# Patient Record
Sex: MALE | Race: WHITE | NOT HISPANIC OR LATINO | URBAN - METROPOLITAN AREA
[De-identification: names, ages, dates, MRNs, and addresses within clinical notes are randomized per-mention and may not be internally consistent; named-entity substitution may affect disease eponyms.]

---

## 2017-02-26 ENCOUNTER — EMERGENCY (EMERGENCY)
Facility: HOSPITAL | Age: 37
LOS: 1 days | Discharge: HOME | End: 2017-02-26
Attending: EMERGENCY MEDICINE | Admitting: EMERGENCY MEDICINE
Payer: SELF-PAY

## 2017-02-26 VITALS
DIASTOLIC BLOOD PRESSURE: 75 MMHG | RESPIRATION RATE: 20 BRPM | SYSTOLIC BLOOD PRESSURE: 110 MMHG | HEART RATE: 79 BPM | OXYGEN SATURATION: 98 % | WEIGHT: 134.92 LBS | TEMPERATURE: 98 F

## 2017-02-26 PROCEDURE — 99283 EMERGENCY DEPT VISIT LOW MDM: CPT

## 2017-02-26 RX ORDER — PENICILLIN V POTASSIUM 250 MG
1 TABLET ORAL
Qty: 20 | Refills: 0
Start: 2017-02-26 | End: 2017-03-08

## 2017-02-26 RX ORDER — GENTAMICIN SULFATE 3 MG/ML
1 SOLUTION/ DROPS OPHTHALMIC
Qty: 1 | Refills: 0
Start: 2017-02-26 | End: 2017-03-05

## 2017-02-26 NOTE — ED STATDOCS - ATTENDING CONTRIBUTION TO CARE
I, Festus Nelson, performed the initial face to face bedside interview with this patient regarding history of present illness, review of symptoms and relevant past medical, social and family history.  I completed an independent physical examination.  I was the initial provider who evaluated this patient. I have signed out the follow up of any pending tests (i.e. labs, radiological studies) to the ACP.  I have communicated the patient’s plan of care and disposition with the ACP.  The history, relevant review of systems, past medical and surgical history, medical decision making, and physical examination was documented by the scribe in my presence and I attest to the accuracy of the documentation.

## 2017-02-26 NOTE — ED STATDOCS - NS ED MD SCRIBE ATTENDING SCRIBE SECTIONS
HISTORY OF PRESENT ILLNESS/HIV/VITAL SIGNS( Pullset)/INTAKE ASSESSMENT/SCREENINGS/PHYSICAL EXAM/DISPOSITION/REVIEW OF SYSTEMS/PAST MEDICAL/SURGICAL/SOCIAL HISTORY

## 2017-02-26 NOTE — ED STATDOCS - PROGRESS NOTE DETAILS
+ right upper gum tenderness. + multiple dental caries. No gum swelling.   VA: 20/25 bilat. Slit lamp exam- No stain uptake. No FB's noted. No rust rings. + clear cornea. + PERRLA.

## 2017-02-26 NOTE — ED STATDOCS - MEDICAL DECISION MAKING DETAILS
Pt with possible conjunctivitis. Will stain eye to r/o corneal abrasions and possibly treat for conjunctivitis. Pt with possible conjunctivitis. Will stain eye to r/o corneal abrasions and possibly treat for conjunctivitis.  Will treat for possible dental infection as well.

## 2017-02-26 NOTE — ED STATDOCS - EYES, MLM
Visual acuity: 20/25 on right, 20/20 on left. EOMI. Sclera not injected. Mild erythema to right conjunctiva.

## 2017-02-26 NOTE — ED STATDOCS - OBJECTIVE STATEMENT
36 year old male, with hx of asthma, presenting to the ED complaining of right eye pain and pain around his right eye. Pt states that he felt pressure and pain around his right eye yesterday. He states that he fell asleep and awoke to eye pain and pain around his eye again. Pt states that his right eye was watery and had slight discharge and crusting to his right eye. He states that he feels pain with rubbing his right eye. Pt denies having any recent falls or trauma to the area. He also denies having any change in vision or dental pain. No further complaints at this time.

## 2021-06-10 ENCOUNTER — EMERGENCY (EMERGENCY)
Facility: HOSPITAL | Age: 41
LOS: 1 days | Discharge: HOME | End: 2021-06-10
Attending: STUDENT IN AN ORGANIZED HEALTH CARE EDUCATION/TRAINING PROGRAM
Payer: MEDICAID

## 2021-06-10 VITALS
OXYGEN SATURATION: 98 % | DIASTOLIC BLOOD PRESSURE: 85 MMHG | TEMPERATURE: 99 F | SYSTOLIC BLOOD PRESSURE: 140 MMHG | HEART RATE: 75 BPM | RESPIRATION RATE: 18 BRPM

## 2021-06-10 VITALS
TEMPERATURE: 99 F | HEART RATE: 95 BPM | WEIGHT: 134.92 LBS | DIASTOLIC BLOOD PRESSURE: 88 MMHG | RESPIRATION RATE: 20 BRPM | SYSTOLIC BLOOD PRESSURE: 139 MMHG

## 2021-06-10 LAB
ALBUMIN SERPL ELPH-MCNC: 4.8 G/DL — SIGNIFICANT CHANGE UP (ref 3.3–5.2)
ALP SERPL-CCNC: 90 U/L — SIGNIFICANT CHANGE UP (ref 40–120)
ALT FLD-CCNC: 7 U/L — SIGNIFICANT CHANGE UP
ANION GAP SERPL CALC-SCNC: 9 MMOL/L — SIGNIFICANT CHANGE UP (ref 5–17)
AST SERPL-CCNC: 15 U/L — SIGNIFICANT CHANGE UP
BASOPHILS # BLD AUTO: 0.11 K/UL — SIGNIFICANT CHANGE UP (ref 0–0.2)
BASOPHILS NFR BLD AUTO: 0.8 % — SIGNIFICANT CHANGE UP (ref 0–2)
BILIRUB SERPL-MCNC: 0.5 MG/DL — SIGNIFICANT CHANGE UP (ref 0.4–2)
BUN SERPL-MCNC: 10.1 MG/DL — SIGNIFICANT CHANGE UP (ref 8–20)
CALCIUM SERPL-MCNC: 9.6 MG/DL — SIGNIFICANT CHANGE UP (ref 8.6–10.2)
CHLORIDE SERPL-SCNC: 100 MMOL/L — SIGNIFICANT CHANGE UP (ref 98–107)
CK SERPL-CCNC: 89 U/L — SIGNIFICANT CHANGE UP (ref 30–200)
CO2 SERPL-SCNC: 27 MMOL/L — SIGNIFICANT CHANGE UP (ref 22–29)
CREAT SERPL-MCNC: 0.93 MG/DL — SIGNIFICANT CHANGE UP (ref 0.5–1.3)
EOSINOPHIL # BLD AUTO: 0.09 K/UL — SIGNIFICANT CHANGE UP (ref 0–0.5)
EOSINOPHIL NFR BLD AUTO: 0.6 % — SIGNIFICANT CHANGE UP (ref 0–6)
GLUCOSE SERPL-MCNC: 114 MG/DL — HIGH (ref 70–99)
HCT VFR BLD CALC: 44.5 % — SIGNIFICANT CHANGE UP (ref 39–50)
HGB BLD-MCNC: 15.5 G/DL — SIGNIFICANT CHANGE UP (ref 13–17)
IMM GRANULOCYTES NFR BLD AUTO: 0.3 % — SIGNIFICANT CHANGE UP (ref 0–1.5)
LYMPHOCYTES # BLD AUTO: 14.7 % — SIGNIFICANT CHANGE UP (ref 13–44)
LYMPHOCYTES # BLD AUTO: 2.1 K/UL — SIGNIFICANT CHANGE UP (ref 1–3.3)
MCHC RBC-ENTMCNC: 31.8 PG — SIGNIFICANT CHANGE UP (ref 27–34)
MCHC RBC-ENTMCNC: 34.8 GM/DL — SIGNIFICANT CHANGE UP (ref 32–36)
MCV RBC AUTO: 91.4 FL — SIGNIFICANT CHANGE UP (ref 80–100)
MONOCYTES # BLD AUTO: 1.2 K/UL — HIGH (ref 0–0.9)
MONOCYTES NFR BLD AUTO: 8.4 % — SIGNIFICANT CHANGE UP (ref 2–14)
NEUTROPHILS # BLD AUTO: 10.71 K/UL — HIGH (ref 1.8–7.4)
NEUTROPHILS NFR BLD AUTO: 75.2 % — SIGNIFICANT CHANGE UP (ref 43–77)
PLATELET # BLD AUTO: 202 K/UL — SIGNIFICANT CHANGE UP (ref 150–400)
POTASSIUM SERPL-MCNC: 3.6 MMOL/L — SIGNIFICANT CHANGE UP (ref 3.5–5.3)
POTASSIUM SERPL-SCNC: 3.6 MMOL/L — SIGNIFICANT CHANGE UP (ref 3.5–5.3)
PROT SERPL-MCNC: 7.7 G/DL — SIGNIFICANT CHANGE UP (ref 6.6–8.7)
RBC # BLD: 4.87 M/UL — SIGNIFICANT CHANGE UP (ref 4.2–5.8)
RBC # FLD: 12.4 % — SIGNIFICANT CHANGE UP (ref 10.3–14.5)
SODIUM SERPL-SCNC: 136 MMOL/L — SIGNIFICANT CHANGE UP (ref 135–145)
TSH SERPL-MCNC: 2.28 UIU/ML — SIGNIFICANT CHANGE UP (ref 0.27–4.2)
WBC # BLD: 14.25 K/UL — HIGH (ref 3.8–10.5)
WBC # FLD AUTO: 14.25 K/UL — HIGH (ref 3.8–10.5)

## 2021-06-10 PROCEDURE — 99283 EMERGENCY DEPT VISIT LOW MDM: CPT

## 2021-06-10 PROCEDURE — 80053 COMPREHEN METABOLIC PANEL: CPT

## 2021-06-10 PROCEDURE — 82550 ASSAY OF CK (CPK): CPT

## 2021-06-10 PROCEDURE — 99284 EMERGENCY DEPT VISIT MOD MDM: CPT

## 2021-06-10 PROCEDURE — 36415 COLL VENOUS BLD VENIPUNCTURE: CPT

## 2021-06-10 PROCEDURE — 93005 ELECTROCARDIOGRAM TRACING: CPT

## 2021-06-10 PROCEDURE — 84443 ASSAY THYROID STIM HORMONE: CPT

## 2021-06-10 PROCEDURE — 93010 ELECTROCARDIOGRAM REPORT: CPT

## 2021-06-10 PROCEDURE — 85025 COMPLETE CBC W/AUTO DIFF WBC: CPT

## 2021-06-10 RX ADMIN — Medication 1 MILLIGRAM(S): at 02:56

## 2021-06-10 NOTE — ED PROVIDER NOTE - CLINICAL SUMMARY MEDICAL DECISION MAKING FREE TEXT BOX
39yo M presenting with feeling anxious, c/o intermittent diffuse body/joint aches which make him panic at times. Suspect symptoms related to anxiety. Given pt has not seen a doctor in several years labs, ekg ordered. Pt agreeable to trial of ativan 39yo M presenting with feeling anxious, c/o intermittent diffuse body/joint aches which make him panic at times. Suspect symptoms related to anxiety. Given pt has not seen a doctor in several years labs, ekg ordered. Pt agreeable to trial of ativan. Labs reviewed, no emergent intervention warranted based on results. EKG NSR, non-ischemic. Given info for FSL

## 2021-06-10 NOTE — ED PROVIDER NOTE - NSFOLLOWUPINSTRUCTIONS_ED_ALL_ED_FT
- Follow up with a primary doctor within 1-2 weeks, you may also follow-up with [facility] clinic listed below  - Return to the ED for any new or worsening symptoms.

## 2021-06-10 NOTE — ED PROVIDER NOTE - PHYSICAL EXAMINATION
Gen: No acute distress, appears anxious  HEENT: Mucous membranes moist, pink conjunctivae, EOMI  CV: RRR, nl s1/s2.  Resp: CTAB, normal rate and effort  GI: Abdomen soft, NT, ND. No rebound, no guarding  : No CVAT  Neuro: A&O x 3, moving all 4 extremities, ambulatory with steady gait  MSK: No spine or joint tenderness to palpation, FROM ext x 4  Skin: No rashes. intact and perfused.   Psych: Anxious appearing, perseverating on symptoms, No SI/HI

## 2021-06-10 NOTE — ED PROVIDER NOTE - NS ED ROS FT
Gen: denies fever, chills, fatigue, weight loss  Skin: denies rashes, laceration, bruising  HEENT: denies visual changes, ear pain, nasal congestion, throat pain  Respiratory: denies WISE, SOB, cough, wheezing  Cardiovascular: +Heart racing. Denies chest pain, diaphoresis, LE edema  GI: denies abdominal pain, n/v/d  : denies dysuria, frequency, urgency, bowel/bladder incontinence  MSK: +Body aches. Denies joint swelling, back pain, neck pain  Neuro: denies headache, dizziness, weakness, numbness  Psych: +Anxiety. Denies depression, SI/HI, visual/auditory hallucinations

## 2021-06-10 NOTE — ED PROVIDER NOTE - PATIENT PORTAL LINK FT
You can access the FollowMyHealth Patient Portal offered by [facility] by registering at the following website: [email or web address]. By joining [facility]’s FollowMyHealth portal, you will also be able to view your health information using other applications (apps) compatible with our system.

## 2021-06-10 NOTE — ED PROVIDER NOTE - OBJECTIVE STATEMENT
41yo M no pmhx presents to ED c/o feeling anxious. Pt states for the past few days he has not "felt well", feels like he may be having panic attacks. States he sometimes will get pains in his forearms b/l and right foot and will begin to feel anxious when he thinks about the pain or if he looks at his arms and sees his veins "bulging". States he has not had any injuries or trauma. Also mentions he found a tick on his right inner thigh a few weeks ago, found it quickly after being bitten and removed it. States when he thinks about his body aches his heart sometimes races as well. Has never been on anxiety medication, does not see psychiatrist. Has not seen a doctor in a long time as he does not have insurance. Denies SI/HI, hallucinations, cp, sob, headache, dizziness, fever, ETOH use, drug use.

## 2021-06-10 NOTE — ED ADULT TRIAGE NOTE - CHIEF COMPLAINT QUOTE
PT Coming to ED C/O of a panic attack. Increased anxiety the past 2 days. PT states he has body aches and nausea. Recently had a tick bite to his right inner thigh.  Froylan SOB or chest pain.

## 2021-06-10 NOTE — ED PROVIDER NOTE - ATTENDING CONTRIBUTION TO CARE
41yo male with no pmh presents for anxiety. Pt states for the past few days he feels he's been having panic attacks, States he feels tingling in his extremities and sees his veins bulging. Pt had a tick a few weeks ago but pulled out right away was not embedded. No rash. Pt denies SI/HI/VH/AH, fevers/chills, ha, loc, focal neuro deficits, cp/sob/palp, cough, abd pain/n/v/d, urinary symptoms, recent travel and sick contacts.  Const: Awake, alert and oriented. In no acute distress. anxious appearing.  HEENT: NC/AT. Moist mucous membranes.  Eyes: No scleral icterus. EOMI.  Neck:. Soft and supple. Full ROM without pain.  Cardiac: Regular rate and regular rhythm. +S1/S2. Peripheral pulses 2+ and symmetric. No LE edema.  Resp: Speaking in full sentences. No evidence of respiratory distress. No wheezes, rales or rhonchi.  Abd: Soft, non-tender, non-distended. Normal bowel sounds in all 4 quadrants. No guarding or rebound.  Back: Spine midline and non-tender. No CVAT.  Skin: No rashes, abrasions or lacerations.  Lymph: No cervical lymphadenopathy.  Neuro: A&Ox3, moving all extremities symmetrically, follows commands, motor erum upper and lower ext 5/5, sensory symm and intact CN 2-12 grossly intact, no ataxia, no nystagmus, no dysmetria, no ddk, symm erum, no pronator drift  labs wnl, ekg wnl, improved with anxiolytic, stable for dc with follow up

## 2021-08-06 PROBLEM — Z00.00 ENCOUNTER FOR PREVENTIVE HEALTH EXAMINATION: Status: ACTIVE | Noted: 2021-08-06

## 2021-09-14 ENCOUNTER — APPOINTMENT (OUTPATIENT)
Dept: OTOLARYNGOLOGY | Facility: CLINIC | Age: 41
End: 2021-09-14
Payer: MEDICAID

## 2021-09-14 VITALS
WEIGHT: 116 LBS | SYSTOLIC BLOOD PRESSURE: 133 MMHG | HEIGHT: 68 IN | DIASTOLIC BLOOD PRESSURE: 87 MMHG | HEART RATE: 75 BPM | BODY MASS INDEX: 17.58 KG/M2

## 2021-09-14 DIAGNOSIS — Z87.09 PERSONAL HISTORY OF OTHER DISEASES OF THE RESPIRATORY SYSTEM: ICD-10-CM

## 2021-09-14 DIAGNOSIS — Z78.9 OTHER SPECIFIED HEALTH STATUS: ICD-10-CM

## 2021-09-14 PROCEDURE — 99203 OFFICE O/P NEW LOW 30 MIN: CPT

## 2021-09-14 NOTE — CONSULT LETTER
[Dear  ___] : Dear  [unfilled], [Consult Letter:] : I had the pleasure of evaluating your patient, [unfilled]. [Please see my note below.] : Please see my note below. [Consult Closing:] : Thank you very much for allowing me to participate in the care of this patient.  If you have any questions, please do not hesitate to contact me. [Sincerely,] : Sincerely, [FreeTextEntry2] : Dr. Rod Hamilton MD ([place], [place]) [FreeTextEntry3] : Will Mireles MD, FACS\par \par [facility] [place]\par Associate Chair\par Department of Otolaryngology\par Professor\par Otolaryngology & Molecular Medicine\par [facility] [employer]\par

## 2021-09-14 NOTE — CONSULT LETTER
[Dear  ___] : Dear  [unfilled], [Consult Letter:] : I had the pleasure of evaluating your patient, [unfilled]. [Please see my note below.] : Please see my note below. [Consult Closing:] : Thank you very much for allowing me to participate in the care of this patient.  If you have any questions, please do not hesitate to contact me. [Sincerely,] : Sincerely, [FreeTextEntry2] : Dr. Rod Hamilton MD ([place], [place]) [FreeTextEntry3] : Will Mireles MD, FACS\par \par [facility] [place]\par Associate Chair\par Department of Otolaryngology\par Professor\par Otolaryngology & Molecular Medicine\par [facility] [employer]\par

## 2021-09-14 NOTE — HISTORY OF PRESENT ILLNESS
[de-identified] : 40 year old male referred by Dr. Rod Hamilton, PCP, for right parotid mass.  States noticed mass years ago, has gotten larger since first noticed.  States occasionally painful.  Neck ultrasound 7/28/21 impression: right neck mass corresponds to right parotid vascular solid lesion.  FNA advised.  Denies fevers, chills, night sweats, or weight loss.\par Received Nico and Nico Covid 19 vaccine: 4/3/21. present more than 5 years.  not overtly painful.  has been enlarging.  reports good health otherwise. \par \par pt smokes half ppd.

## 2021-09-14 NOTE — PHYSICAL EXAM
[Nodule] : nodule [FreeTextEntry1] : 4 cm mobile R lower parotid body mass.  no palp nodes [Midline] : trachea located in midline position [Normal] : no rashes

## 2021-09-14 NOTE — HISTORY OF PRESENT ILLNESS
[de-identified] : 40 year old male referred by Dr. Rod Hamilton, PCP, for right parotid mass.  States noticed mass years ago, has gotten larger since first noticed.  States occasionally painful.  Neck ultrasound 7/28/21 impression: right neck mass corresponds to right parotid vascular solid lesion.  FNA advised.  Denies fevers, chills, night sweats, or weight loss.\par Received Nico and Nico Covid 19 vaccine: 4/3/21. present more than 5 years.  not overtly painful.  has been enlarging.  reports good health otherwise. \par \par pt smokes half ppd.

## 2021-09-14 NOTE — REASON FOR VISIT
[Initial Evaluation] : an initial evaluation for [Initial Consultation] : an initial consultation for [FreeTextEntry2] : referred by Dr. Rod Hamilton, PCP, for right parotid mass

## 2021-09-16 ENCOUNTER — RESULT REVIEW (OUTPATIENT)
Age: 41
End: 2021-09-16

## 2021-09-27 ENCOUNTER — APPOINTMENT (OUTPATIENT)
Dept: MRI IMAGING | Facility: CLINIC | Age: 41
End: 2021-09-27
Payer: MEDICAID

## 2021-09-27 ENCOUNTER — OUTPATIENT (OUTPATIENT)
Dept: OUTPATIENT SERVICES | Facility: HOSPITAL | Age: 41
LOS: 1 days | End: 2021-09-27

## 2021-09-27 DIAGNOSIS — K11.8 OTHER DISEASES OF SALIVARY GLANDS: ICD-10-CM

## 2021-09-27 DIAGNOSIS — Z00.8 ENCOUNTER FOR OTHER GENERAL EXAMINATION: ICD-10-CM

## 2021-09-27 PROCEDURE — 70543 MRI ORBT/FAC/NCK W/O &W/DYE: CPT | Mod: 26

## 2021-10-05 ENCOUNTER — RESULT REVIEW (OUTPATIENT)
Age: 41
End: 2021-10-05

## 2021-10-05 ENCOUNTER — APPOINTMENT (OUTPATIENT)
Dept: ULTRASOUND IMAGING | Facility: CLINIC | Age: 41
End: 2021-10-05
Payer: MEDICAID

## 2021-10-05 ENCOUNTER — OUTPATIENT (OUTPATIENT)
Dept: OUTPATIENT SERVICES | Facility: HOSPITAL | Age: 41
LOS: 1 days | End: 2021-10-05
Payer: MEDICAID

## 2021-10-05 DIAGNOSIS — Z00.8 ENCOUNTER FOR OTHER GENERAL EXAMINATION: ICD-10-CM

## 2021-10-05 PROCEDURE — 88305 TISSUE EXAM BY PATHOLOGIST: CPT | Mod: 26

## 2021-10-05 PROCEDURE — 88305 TISSUE EXAM BY PATHOLOGIST: CPT

## 2021-10-05 PROCEDURE — 88173 CYTOPATH EVAL FNA REPORT: CPT | Mod: 26

## 2021-10-05 PROCEDURE — 88173 CYTOPATH EVAL FNA REPORT: CPT

## 2021-10-05 PROCEDURE — 10005 FNA BX W/US GDN 1ST LES: CPT

## 2021-10-14 LAB — NON-GYNECOLOGICAL CYTOLOGY STUDY: SIGNIFICANT CHANGE UP

## 2021-11-08 ENCOUNTER — OUTPATIENT (OUTPATIENT)
Dept: OUTPATIENT SERVICES | Facility: HOSPITAL | Age: 41
LOS: 1 days | End: 2021-11-08
Payer: MEDICAID

## 2021-11-08 VITALS
TEMPERATURE: 98 F | RESPIRATION RATE: 18 BRPM | WEIGHT: 109.35 LBS | SYSTOLIC BLOOD PRESSURE: 126 MMHG | HEIGHT: 68 IN | DIASTOLIC BLOOD PRESSURE: 86 MMHG | HEART RATE: 92 BPM | OXYGEN SATURATION: 100 %

## 2021-11-08 DIAGNOSIS — F17.200 NICOTINE DEPENDENCE, UNSPECIFIED, UNCOMPLICATED: ICD-10-CM

## 2021-11-08 DIAGNOSIS — Z01.818 ENCOUNTER FOR OTHER PREPROCEDURAL EXAMINATION: ICD-10-CM

## 2021-11-08 DIAGNOSIS — K11.8 OTHER DISEASES OF SALIVARY GLANDS: ICD-10-CM

## 2021-11-08 DIAGNOSIS — F41.9 ANXIETY DISORDER, UNSPECIFIED: ICD-10-CM

## 2021-11-08 LAB
ANION GAP SERPL CALC-SCNC: 10 MMOL/L — SIGNIFICANT CHANGE UP (ref 5–17)
BUN SERPL-MCNC: 8 MG/DL — SIGNIFICANT CHANGE UP (ref 7–23)
CALCIUM SERPL-MCNC: 9.3 MG/DL — SIGNIFICANT CHANGE UP (ref 8.4–10.5)
CHLORIDE SERPL-SCNC: 104 MMOL/L — SIGNIFICANT CHANGE UP (ref 96–108)
CO2 SERPL-SCNC: 27 MMOL/L — SIGNIFICANT CHANGE UP (ref 22–31)
CREAT SERPL-MCNC: 1.05 MG/DL — SIGNIFICANT CHANGE UP (ref 0.5–1.3)
GLUCOSE SERPL-MCNC: 98 MG/DL — SIGNIFICANT CHANGE UP (ref 70–99)
HCT VFR BLD CALC: 46.9 % — SIGNIFICANT CHANGE UP (ref 39–50)
HGB BLD-MCNC: 16.1 G/DL — SIGNIFICANT CHANGE UP (ref 13–17)
MCHC RBC-ENTMCNC: 32.1 PG — SIGNIFICANT CHANGE UP (ref 27–34)
MCHC RBC-ENTMCNC: 34.3 GM/DL — SIGNIFICANT CHANGE UP (ref 32–36)
MCV RBC AUTO: 93.4 FL — SIGNIFICANT CHANGE UP (ref 80–100)
NRBC # BLD: 0 /100 WBCS — SIGNIFICANT CHANGE UP (ref 0–0)
PLATELET # BLD AUTO: 225 K/UL — SIGNIFICANT CHANGE UP (ref 150–400)
POTASSIUM SERPL-MCNC: 4.1 MMOL/L — SIGNIFICANT CHANGE UP (ref 3.5–5.3)
POTASSIUM SERPL-SCNC: 4.1 MMOL/L — SIGNIFICANT CHANGE UP (ref 3.5–5.3)
RBC # BLD: 5.02 M/UL — SIGNIFICANT CHANGE UP (ref 4.2–5.8)
RBC # FLD: 12.8 % — SIGNIFICANT CHANGE UP (ref 10.3–14.5)
SODIUM SERPL-SCNC: 141 MMOL/L — SIGNIFICANT CHANGE UP (ref 135–145)
WBC # BLD: 8.68 K/UL — SIGNIFICANT CHANGE UP (ref 3.8–10.5)
WBC # FLD AUTO: 8.68 K/UL — SIGNIFICANT CHANGE UP (ref 3.8–10.5)

## 2021-11-08 PROCEDURE — 85027 COMPLETE CBC AUTOMATED: CPT

## 2021-11-08 PROCEDURE — 93010 ELECTROCARDIOGRAM REPORT: CPT | Mod: NC

## 2021-11-08 PROCEDURE — 80048 BASIC METABOLIC PNL TOTAL CA: CPT

## 2021-11-08 PROCEDURE — 36415 COLL VENOUS BLD VENIPUNCTURE: CPT

## 2021-11-08 PROCEDURE — 93005 ELECTROCARDIOGRAM TRACING: CPT

## 2021-11-08 PROCEDURE — G0463: CPT

## 2021-11-08 NOTE — H&P PST ADULT - CARDIOVASCULAR COMMENTS
had cardiac work up in 7/2021 due to panic attract causing palpitations- was neg for acute findings per patient

## 2021-11-08 NOTE — H&P PST ADULT - PROBLEM SELECTOR PLAN 1
Scheduled for right parotidectomy w frozen section with Dr Mireles on 11/22/2021.  Pre op instructions given and patient verbalized understanding.  CBC, BMP, EKG and medical clearance pending.  Chlorhexidene wash given with instructions.  NPO after midnight night before procedure.  Instructed to stop all ASA, NSAIDs, vitamins and supplements 1 week prior to surgery.

## 2021-11-08 NOTE — H&P PST ADULT - NSICDXPASTMEDICALHX_GEN_ALL_CORE_FT
PAST MEDICAL HISTORY:  Anxiety     Asthma     Panic disorder      PAST MEDICAL HISTORY:  Anxiety     Asthma     Current smoker     Panic disorder

## 2021-11-08 NOTE — H&P PST ADULT - NEGATIVE ENMT SYMPTOMS
no hearing difficulty/no ear pain/no nasal congestion/no nasal discharge/no nasal obstruction/no post-nasal discharge/no nose bleeds/no recurrent cold sores/no throat pain

## 2021-11-08 NOTE — H&P PST ADULT - HISTORY OF PRESENT ILLNESS
42 y/o female with PMH of presents for PST.  Scheduled for right parotidectomy w frozen sections on 11/22/2021.  COVID- 19 testing  42 y/o male, current every day cigarette and marijuana smoker, with a PMH of anxiety and asthma ( denies recent exacerbation of hx of intubation) of presents for PST.  Scheduled for right parotidectomy w frozen sections on 11/22/2021.  COVID- 19 testing scheduled for 11/19/2021 at [facility]

## 2021-11-08 NOTE — H&P PST ADULT - NSANTHOSAYNRD_GEN_A_CORE
neck 13.25 inches/No. HIMANSHU screening performed.  STOP BANG Legend: 0-2 = LOW Risk; 3-4 = INTERMEDIATE Risk; 5-8 = HIGH Risk

## 2021-11-10 DIAGNOSIS — Z01.818 ENCOUNTER FOR OTHER PREPROCEDURAL EXAMINATION: ICD-10-CM

## 2021-11-19 ENCOUNTER — APPOINTMENT (OUTPATIENT)
Dept: DISASTER EMERGENCY | Facility: CLINIC | Age: 41
End: 2021-11-19

## 2021-11-20 LAB — SARS-COV-2 N GENE NPH QL NAA+PROBE: NOT DETECTED

## 2021-11-21 ENCOUNTER — TRANSCRIPTION ENCOUNTER (OUTPATIENT)
Age: 41
End: 2021-11-21

## 2021-11-22 ENCOUNTER — RESULT REVIEW (OUTPATIENT)
Age: 41
End: 2021-11-22

## 2021-11-22 ENCOUNTER — APPOINTMENT (OUTPATIENT)
Dept: OTOLARYNGOLOGY | Facility: HOSPITAL | Age: 41
End: 2021-11-22

## 2021-11-22 ENCOUNTER — OUTPATIENT (OUTPATIENT)
Dept: OUTPATIENT SERVICES | Facility: HOSPITAL | Age: 41
LOS: 1 days | End: 2021-11-22
Payer: MEDICAID

## 2021-11-22 VITALS
HEART RATE: 86 BPM | HEIGHT: 68 IN | OXYGEN SATURATION: 100 % | TEMPERATURE: 98 F | RESPIRATION RATE: 16 BRPM | WEIGHT: 109.35 LBS | SYSTOLIC BLOOD PRESSURE: 136 MMHG | DIASTOLIC BLOOD PRESSURE: 79 MMHG

## 2021-11-22 VITALS
DIASTOLIC BLOOD PRESSURE: 67 MMHG | OXYGEN SATURATION: 98 % | HEART RATE: 80 BPM | RESPIRATION RATE: 18 BRPM | SYSTOLIC BLOOD PRESSURE: 132 MMHG | TEMPERATURE: 98 F

## 2021-11-22 DIAGNOSIS — K11.8 OTHER DISEASES OF SALIVARY GLANDS: ICD-10-CM

## 2021-11-22 PROCEDURE — 42420 EXCISE PAROTID GLAND/LESION: CPT | Mod: RT

## 2021-11-22 PROCEDURE — 42415 EXCISE PAROTID GLAND/LESION: CPT | Mod: RT

## 2021-11-22 PROCEDURE — 88307 TISSUE EXAM BY PATHOLOGIST: CPT

## 2021-11-22 PROCEDURE — C9399: CPT

## 2021-11-22 PROCEDURE — C1889: CPT

## 2021-11-22 PROCEDURE — 88307 TISSUE EXAM BY PATHOLOGIST: CPT | Mod: 26

## 2021-11-22 RX ORDER — HYDROMORPHONE HYDROCHLORIDE 2 MG/ML
0.5 INJECTION INTRAMUSCULAR; INTRAVENOUS; SUBCUTANEOUS
Refills: 0 | Status: DISCONTINUED | OUTPATIENT
Start: 2021-11-22 | End: 2021-11-22

## 2021-11-22 RX ORDER — IBUPROFEN 200 MG
1 TABLET ORAL
Qty: 0 | Refills: 0 | DISCHARGE

## 2021-11-22 RX ORDER — ONDANSETRON 8 MG/1
4 TABLET, FILM COATED ORAL ONCE
Refills: 0 | Status: DISCONTINUED | OUTPATIENT
Start: 2021-11-22 | End: 2021-11-22

## 2021-11-22 RX ORDER — ACETAMINOPHEN 500 MG
2 TABLET ORAL
Qty: 0 | Refills: 0 | DISCHARGE

## 2021-11-22 RX ORDER — HYDROMORPHONE HYDROCHLORIDE 2 MG/ML
1 INJECTION INTRAMUSCULAR; INTRAVENOUS; SUBCUTANEOUS
Refills: 0 | Status: DISCONTINUED | OUTPATIENT
Start: 2021-11-22 | End: 2021-11-22

## 2021-11-22 RX ORDER — OXYCODONE HYDROCHLORIDE 5 MG/1
5 TABLET ORAL ONCE
Refills: 0 | Status: DISCONTINUED | OUTPATIENT
Start: 2021-11-22 | End: 2021-11-22

## 2021-11-22 RX ORDER — SODIUM CHLORIDE 9 MG/ML
1000 INJECTION, SOLUTION INTRAVENOUS
Refills: 0 | Status: DISCONTINUED | OUTPATIENT
Start: 2021-11-22 | End: 2021-11-22

## 2021-11-22 RX ORDER — CEPHALEXIN 500 MG
1 CAPSULE ORAL
Qty: 15 | Refills: 0
Start: 2021-11-22 | End: 2021-11-26

## 2021-11-22 RX ORDER — OXYCODONE HYDROCHLORIDE 5 MG/1
1 TABLET ORAL
Qty: 12 | Refills: 0
Start: 2021-11-22 | End: 2021-11-24

## 2021-11-22 RX ORDER — DULOXETINE HYDROCHLORIDE 30 MG/1
1 CAPSULE, DELAYED RELEASE ORAL
Qty: 0 | Refills: 0 | DISCHARGE

## 2021-11-22 RX ORDER — ONDANSETRON 8 MG/1
4 TABLET, FILM COATED ORAL ONCE
Refills: 0 | Status: DISCONTINUED | OUTPATIENT
Start: 2021-11-22 | End: 2021-12-06

## 2021-11-22 RX ADMIN — SODIUM CHLORIDE 50 MILLILITER(S): 9 INJECTION, SOLUTION INTRAVENOUS at 09:15

## 2021-11-22 RX ADMIN — HYDROMORPHONE HYDROCHLORIDE 0.5 MILLIGRAM(S): 2 INJECTION INTRAMUSCULAR; INTRAVENOUS; SUBCUTANEOUS at 14:57

## 2021-11-22 RX ADMIN — OXYCODONE HYDROCHLORIDE 5 MILLIGRAM(S): 5 TABLET ORAL at 16:31

## 2021-11-22 RX ADMIN — HYDROMORPHONE HYDROCHLORIDE 0.5 MILLIGRAM(S): 2 INJECTION INTRAMUSCULAR; INTRAVENOUS; SUBCUTANEOUS at 14:43

## 2021-11-22 RX ADMIN — OXYCODONE HYDROCHLORIDE 5 MILLIGRAM(S): 5 TABLET ORAL at 17:01

## 2021-11-22 NOTE — ASU DISCHARGE PLAN (ADULT/PEDIATRIC) - CARE PROVIDER_API CALL
Will Mireles)  [facility]; Otolaryngology  [address] [place] [id number]  Phone: [phone]  Fax: [phone]  Follow Up Time:

## 2021-11-22 NOTE — PRE-ANESTHESIA EVALUATION ADULT - NSANTHADDINFOFT_GEN_ALL_CORE
Discussed GA with ETT in detail with patient and all question sought and answered. Pt. agrees to all plans and wishes to proceed with surgical care.    Medical evaluation/optimization and clearance noted and appreciated.

## 2021-11-22 NOTE — ASU DISCHARGE PLAN (ADULT/PEDIATRIC) - ASU DC SPECIAL INSTRUCTIONSFT
DO NOT GET INCISION WET    EMPTY DRAIN DAILY AND RECORD OUTPUT    TAKE ANTIBIOTIC DAILY UNTIL FINISHED    TAKE OXYCODONE FOR SEVERE PAIN, OTHERWISE TAKE TYLENOL    DO NOT DRIVE WHILE TAKING OXYCODONE    NO HEAVY LIFTING, BENDING OR STRAINING    FOLLOW UP WITH DR. BORRERO ON 11/24/21, PLEASE CALL THE OFFICE FOR AN APPOINTMENT

## 2021-11-23 PROBLEM — F41.0 PANIC DISORDER [EPISODIC PAROXYSMAL ANXIETY]: Chronic | Status: ACTIVE | Noted: 2021-11-08

## 2021-11-23 PROBLEM — F17.200 NICOTINE DEPENDENCE, UNSPECIFIED, UNCOMPLICATED: Chronic | Status: ACTIVE | Noted: 2021-11-08

## 2021-11-23 PROBLEM — J45.909 UNSPECIFIED ASTHMA, UNCOMPLICATED: Chronic | Status: ACTIVE | Noted: 2021-11-08

## 2021-11-23 PROBLEM — F41.9 ANXIETY DISORDER, UNSPECIFIED: Chronic | Status: ACTIVE | Noted: 2021-11-08

## 2021-11-24 ENCOUNTER — APPOINTMENT (OUTPATIENT)
Dept: OTOLARYNGOLOGY | Facility: CLINIC | Age: 41
End: 2021-11-24
Payer: MEDICAID

## 2021-11-24 VITALS — TEMPERATURE: 97.7 F

## 2021-11-24 PROCEDURE — 99024 POSTOP FOLLOW-UP VISIT: CPT

## 2021-11-24 NOTE — HISTORY OF PRESENT ILLNESS
[None] : No associated symptoms are reported. [de-identified] : Mr. Taylor is s/p right parotidectomy on 11/22/2021 for parotid gland mass. Reports feeling well and has not noticed much fluid coming out of the drain. Denies fever, pain, dysphagia, dysphonia and or dyspnea. He is taking antibiotics as prescribed.

## 2021-11-24 NOTE — REASON FOR VISIT
[Post-Operative Visit] : a post-operative visit [FreeTextEntry2] : s/p right parotidectomy on 11/22/2021

## 2021-11-30 LAB — SURGICAL PATHOLOGY STUDY: SIGNIFICANT CHANGE UP

## 2021-12-01 ENCOUNTER — APPOINTMENT (OUTPATIENT)
Dept: OTOLARYNGOLOGY | Facility: CLINIC | Age: 41
End: 2021-12-01
Payer: MEDICAID

## 2021-12-01 PROCEDURE — 99024 POSTOP FOLLOW-UP VISIT: CPT

## 2021-12-06 NOTE — CONSULT LETTER
[Dear  ___] : Dear  [unfilled], [Please see my note below.] : Please see my note below. [Sincerely,] : Sincerely, [FreeTextEntry2] : Dr. Rod Hamilton MD ([place], [place])  [FreeTextEntry3] : Stacy Hairston ([place]) PAC\par Dr. Will Mireles\par Head and Neck Surgery\par [facility]\par [address]\[place] [address]\par Tel: [phone]\par \par

## 2021-12-06 NOTE — HISTORY OF PRESENT ILLNESS
[de-identified] : Pt is here today post right parotidectomy on 11/22/2021 for parotid mass.  pt is doing well, and no c.o. pt has no neck/face redness, no neck/face mass, no difficulty swallowing, no neck pain, no painful swallowing and no chills. final path cw pleomorphic adenoma. \par \par

## 2022-01-11 ENCOUNTER — APPOINTMENT (OUTPATIENT)
Dept: OTOLARYNGOLOGY | Facility: CLINIC | Age: 42
End: 2022-01-11
Payer: MEDICAID

## 2022-01-11 VITALS
SYSTOLIC BLOOD PRESSURE: 126 MMHG | HEART RATE: 80 BPM | HEIGHT: 68 IN | WEIGHT: 119 LBS | BODY MASS INDEX: 18.04 KG/M2 | DIASTOLIC BLOOD PRESSURE: 85 MMHG | OXYGEN SATURATION: 99 %

## 2022-01-11 DIAGNOSIS — K11.8 OTHER DISEASES OF SALIVARY GLANDS: ICD-10-CM

## 2022-01-11 PROCEDURE — 99024 POSTOP FOLLOW-UP VISIT: CPT

## 2022-01-11 NOTE — HISTORY OF PRESENT ILLNESS
[None] : No associated symptoms are reported. [de-identified] : Mr. Taylor is s/p  right parotidectomy on 11/22/2021 for parotid mass.Final path cw pleomorphic adenoma. \par Denies pain, dysphagia, dysphonia and or dyspnea .  co some neck stiffness\par

## 2022-01-11 NOTE — CONSULT LETTER
[Dear  ___] : Dear  [unfilled], [Courtesy Letter:] : I had the pleasure of seeing your patient, [unfilled], in my office today. [Please see my note below.] : Please see my note below. [Sincerely,] : Sincerely, [FreeTextEntry2] : Dr. Rod Hamilton MD ([place], [place]) \par \par  [FreeTextEntry3] : Will Mireles MD, FACS\par \par    [facility] [place]\par Associate Chair\par    Department of Otolaryngology\par \par Professor\par Otolaryngology & Molecular Medicine\par [facility] [employer]\par

## 2022-12-05 NOTE — ED PROVIDER NOTE - NS ED MD DISPO DISCHARGE
Consults    Reason for the Consult: Iron deficiency anemia    Staging: Cancer Staging  No matching staging information was found for the patient.     History Of Present Illness  Monie is a 48 year old female presenting with history of iron deficiency anemia.  Presently she states that she has fatigue ongoing heavy periods which has been worse for the past 6 months.  She received Lupron recently but this has not taken effect yet she does have some mild hot flashes.  She has a sensation of feeling dizzy when she gets up sometimes she has occasional headaches and occasional dyspnea on exertion.  She denies any chest pain or cough.  She denies any change in her vision or swallowing difficulties or hearing loss or nausea or vomiting or fevers or chills or night sweats or weight loss.  She denies any urination problems or diarrhea or constipation.  She has had heavy periods for as long as she can remember and has been worse for past 6 months.  She denies any blood in her rectum or any blood in her urine or hematemesis.  She denies any neuropathy or focal neurological deficits other than occasional dizziness.  She is able to do her own self-care and all her ADLs.    Performance status: ECOG 0    Hematologic history prior presentation:  Remembers being told that she has been anemic since childhood  She has had microcytic anemia since at least 2018  She remembers trying oral iron with intolerance with nausea and vomiting and states that she received IV iron 4 weekly injections and approximately 2019 and the [facility] complicated by development of painful lymphadenopathy but no hives or shortness of breath.  Lymphadenopathy quickly resolved  10/24/2019 % sat 2 ferritin 3  11/3/2022 WBC 11.8 hemoglobin 8.2 MCV 63.5 platelets 399 normal differential      Review of Systems    12 point ROS obtained and negative other that mentioned the HPI.    Past Medical History  Past Medical History:   Diagnosis Date   • Anemia     dx in childhood    • Breast cyst     R- follows w/ imaging   • Closed left arm fracture    • Hemorrhoids    • Iron deficiency     unable to tolerate PO or IV Iron   • Kidney stone 2018   • Lipoma of back     x2 R back, x1 abdomen; Present >10yrs   • Mastodynia     [place]   • Menorrhagia     w/ Uterine fibroids - pending Hysterectomy surgery   • Mitral valve prolapse    • Skull fracture (CMS/HCC)     1 yo   • Urinary incontinence         Surgical History  Past Surgical History:   Procedure Laterality Date   • Appendectomy     • Tubal ligation          Social History:  Social History     Tobacco Use   • Smoking status: Current Every Day Smoker     Packs/day: 0.25     Years: 32.00     Pack years: 8.00   • Smokeless tobacco: Never Used   Vaping Use   • Vaping Use: never used   Substance Use Topics   • Alcohol use: No   • Drug use: Never   Lives with her .  Works in IT.    Family History:  Family History   Problem Relation Age of Onset   • Cancer Mother 30        skin cancer   • Cancer, Breast Mother 50   • Heart disease Father    • Cancer Father 50        skin cancer   • Patient is unaware of any medical problems Brother    • Stroke Maternal Grandmother    • Cancer Maternal Grandmother 50        breast cancer   • Rheumatoid Arthritis Maternal Grandmother    • Cancer Maternal Grandfather         lung, prostate, ?bone, throat- unknown age of Dx   • Diabetes Neg Hx    • Systemic Lupus Erythematosus Neg Hx    • Other Neg Hx         gout        Allergies:  ALLERGIES:  Iron and Citrus   (food or med)    Medications:  (Not in a hospital admission)    Current Outpatient Medications   Medication Sig Dispense Refill   • leuprolide, 1 Month, (Lupron Depot, 1-Month,) 3.75 MG injection Inject 3.75 mg into the muscle every 28 days. 1 kit 1     No current facility-administered medications for this visit.         Physical Exam    General:  No acute distress.  Skin:  Skin color, texture, and turgor normal.  No rashor skin lesions.   Head:   Normocephalic, without obvious abnormality,.  Eyes:  Conjunctiva clear, No icterus, EOM's intact both eyes.   Nose:  Nares normal. No drainage or sinus tenderness.  Throat:  Lips, mucosa, and tongue normal; teeth and gums normal. Normal posterior oropharynx.  No stomatitis.  Neck: Supple, symmetrical.  No thyromegaly.  No enlarged cervical, supraclavicular or infraclavicular lymphadenopathy.  Lungs:   Clear bilaterally.  No rhonchi, wheezing or rales.  Symmetrical breath sounds.   Heart:  Regular rate and rhythm. S1 and S2 normal. No murmur, rub or gallop. No peripheral edema  Abdomen:  Soft, tenderness to palpation in the left lower quadrant, bowel sounds normal.  No masses. No hepatomegaly.  No splenomegaly.  Extremities:   Normal, No cyanosis or edema.  No petechiae or ecchymosis, No signs of a DVT  Lymph Nodes:  Cervical, supraclavicular oraxillary nodes normal.  Musculoskeletal: Symmetrical strength and tone.  Neurologic:  Alert and oriented x 3.  Normal affect.  No focal motor sensory defects.  Cooperative.     Last Recorded Vitals:  Visit Vitals  BP (!) 140/83   Pulse (!) 111   Temp 98.8 °F (37.1 °C) (Oral)   Resp 17   Wt 93.9 kg (207 lb 0.2 oz)   LMP 09/15/2022   SpO2 98%   BMI 34.45 kg/m²       Relevant Results:  Labs:    Labs from today are pending    Imaging:    No results found.      Assessment/ Plan:    -Iron deficiency anemia.  Her history is consistent with iron deficiency anemia.  Obtain labs today for confirmation.  If iron deficiency anemia is confirmed recommended rechallenging with iron sucrose weekly x5.  Plan on rechecking counts a couple weeks after completion of treatment to ensure that her iron stores are replete.  Her history of childhood anemia raises concerns for underlying thalassemia trait which needs to be evaluated after repletion of her iron.  The source of her chronic blood loss appears to be heavy menstrual periods and she has been seen by gynecology and has been started on Lupron  with plans on hysterectomy in the near future.    -Pain.  Presently denies any pain that requires intervention    -Psychosocial.  Does not appear to be in emotional distress    Code Status    Code Status: Not on file           Home

## 2024-02-05 ENCOUNTER — APPOINTMENT (OUTPATIENT)
Dept: RHEUMATOLOGY | Facility: CLINIC | Age: 44
End: 2024-02-05
Payer: MEDICAID

## 2024-02-05 VITALS
HEART RATE: 95 BPM | DIASTOLIC BLOOD PRESSURE: 84 MMHG | TEMPERATURE: 98.5 F | SYSTOLIC BLOOD PRESSURE: 128 MMHG | RESPIRATION RATE: 71 BRPM | OXYGEN SATURATION: 98 % | HEIGHT: 68 IN

## 2024-02-05 DIAGNOSIS — Z56.0 UNEMPLOYMENT, UNSPECIFIED: ICD-10-CM

## 2024-02-05 DIAGNOSIS — Z72.0 TOBACCO USE: ICD-10-CM

## 2024-02-05 DIAGNOSIS — F17.210 NICOTINE DEPENDENCE, CIGARETTES, UNCOMPLICATED: ICD-10-CM

## 2024-02-05 DIAGNOSIS — M25.50 PAIN IN UNSPECIFIED JOINT: ICD-10-CM

## 2024-02-05 DIAGNOSIS — Z87.898 PERSONAL HISTORY OF OTHER SPECIFIED CONDITIONS: ICD-10-CM

## 2024-02-05 DIAGNOSIS — Z82.5 FAMILY HISTORY OF ASTHMA AND OTHER CHRONIC LOWER RESPIRATORY DISEASES: ICD-10-CM

## 2024-02-05 DIAGNOSIS — M19.90 UNSPECIFIED OSTEOARTHRITIS, UNSPECIFIED SITE: ICD-10-CM

## 2024-02-05 DIAGNOSIS — R68.89 OTHER GENERAL SYMPTOMS AND SIGNS: ICD-10-CM

## 2024-02-05 DIAGNOSIS — U07.1 COVID-19: ICD-10-CM

## 2024-02-05 DIAGNOSIS — Z78.9 OTHER SPECIFIED HEALTH STATUS: ICD-10-CM

## 2024-02-05 DIAGNOSIS — Z82.69 FAMILY HISTORY OF OTHER DISEASES OF THE MUSCULOSKELETAL SYSTEM AND CONNECTIVE TISSUE: ICD-10-CM

## 2024-02-05 PROCEDURE — 99205 OFFICE O/P NEW HI 60 MIN: CPT

## 2024-02-05 PROCEDURE — 99417 PROLNG OP E/M EACH 15 MIN: CPT

## 2024-02-05 SDOH — ECONOMIC STABILITY - INCOME SECURITY: UNEMPLOYMENT, UNSPECIFIED: Z56.0

## 2024-02-06 RX ORDER — NAPROXEN SODIUM 220 MG
220 TABLET ORAL
Refills: 0 | Status: DISCONTINUED | COMMUNITY
End: 2024-02-06

## 2024-02-06 RX ORDER — NIRMATRELVIR AND RITONAVIR 300-100 MG
20 X 150 MG & KIT ORAL
Qty: 30 | Refills: 0 | Status: DISCONTINUED | COMMUNITY
Start: 2023-12-27

## 2024-02-06 RX ORDER — PENICILLIN V POTASSIUM 500 MG/1
500 TABLET, FILM COATED ORAL
Qty: 28 | Refills: 0 | Status: DISCONTINUED | COMMUNITY
Start: 2023-12-21

## 2024-02-06 RX ORDER — AMOXICILLIN AND CLAVULANATE POTASSIUM 875; 125 MG/1; MG/1
875-125 TABLET, COATED ORAL
Qty: 20 | Refills: 0 | Status: DISCONTINUED | COMMUNITY
Start: 2023-10-31

## 2024-02-06 RX ORDER — SULINDAC 200 MG/1
200 TABLET ORAL
Qty: 60 | Refills: 0 | Status: DISCONTINUED | COMMUNITY
Start: 2023-12-27 | End: 2024-02-06

## 2024-02-06 RX ORDER — MUPIROCIN 20 MG/G
2 OINTMENT TOPICAL
Qty: 22 | Refills: 0 | Status: DISCONTINUED | COMMUNITY
Start: 2023-10-31

## 2024-02-06 RX ORDER — ACETAMINOPHEN 500 MG/1
500 TABLET, COATED ORAL
Qty: 100 | Refills: 0 | Status: ACTIVE | COMMUNITY
Start: 2024-02-06

## 2024-02-06 RX ORDER — AMOXICILLIN 500 MG/1
500 CAPSULE ORAL
Qty: 21 | Refills: 0 | Status: DISCONTINUED | COMMUNITY
Start: 2023-09-16

## 2024-02-06 RX ORDER — AMOXICILLIN 500 MG/1
500 TABLET, FILM COATED ORAL
Qty: 21 | Refills: 0 | Status: DISCONTINUED | COMMUNITY
Start: 2023-11-30

## 2024-02-06 RX ORDER — ALBUTEROL SULFATE 90 UG/1
108 (90 BASE) INHALANT RESPIRATORY (INHALATION)
Qty: 18 | Refills: 0 | Status: ACTIVE | COMMUNITY
Start: 2024-01-31

## 2024-02-09 ENCOUNTER — LABORATORY RESULT (OUTPATIENT)
Age: 44
End: 2024-02-09

## 2024-02-09 ENCOUNTER — NON-APPOINTMENT (OUTPATIENT)
Age: 44
End: 2024-02-09

## 2024-02-20 ENCOUNTER — APPOINTMENT (OUTPATIENT)
Dept: RHEUMATOLOGY | Facility: CLINIC | Age: 44
End: 2024-02-20
Payer: MEDICAID

## 2024-02-20 VITALS
SYSTOLIC BLOOD PRESSURE: 124 MMHG | HEIGHT: 68 IN | DIASTOLIC BLOOD PRESSURE: 76 MMHG | OXYGEN SATURATION: 99 % | HEART RATE: 107 BPM | TEMPERATURE: 98.7 F

## 2024-02-20 LAB
14-3-3 ETA AG SER IA-MCNC: <0.2 NG/ML
ACE BLD-CCNC: 79 U/L
ALBUMIN MFR SERPL ELPH: 62.5 %
ALBUMIN SERPL ELPH-MCNC: 4.7 G/DL
ALBUMIN SERPL-MCNC: 4.5 G/DL
ALBUMIN/GLOB SERPL: 1.7 RATIO
ALP BLD-CCNC: 78 U/L
ALPHA1 GLOB MFR SERPL ELPH: 4.3 %
ALPHA1 GLOB SERPL ELPH-MCNC: 0.3 G/DL
ALPHA2 GLOB MFR SERPL ELPH: 10 %
ALPHA2 GLOB SERPL ELPH-MCNC: 0.7 G/DL
ALT SERPL-CCNC: 13 U/L
ANA SER IF-ACNC: NEGATIVE
ANION GAP SERPL CALC-SCNC: 13 MMOL/L
APPEARANCE: CLEAR
AST SERPL-CCNC: 16 U/L
B-GLOBULIN MFR SERPL ELPH: 11.5 %
B-GLOBULIN SERPL ELPH-MCNC: 0.8 G/DL
BACTERIA: NEGATIVE /HPF
BASOPHILS # BLD AUTO: 0.13 K/UL
BASOPHILS NFR BLD AUTO: 1.6 %
BILIRUB SERPL-MCNC: 0.3 MG/DL
BILIRUBIN URINE: NEGATIVE
BLOOD URINE: NEGATIVE
BUN SERPL-MCNC: 9 MG/DL
CALCIUM SERPL-MCNC: 9.3 MG/DL
CAST: 0 /LPF
CCP AB SER IA-ACNC: <8 UNITS
CHLORIDE SERPL-SCNC: 103 MMOL/L
CK SERPL-CCNC: 88 U/L
CO2 SERPL-SCNC: 23 MMOL/L
COLOR: NORMAL
CORTIS SERPL-MCNC: 19.4 UG/DL
CREAT SERPL-MCNC: 0.91 MG/DL
CREAT SPEC-SCNC: 119 MG/DL
CREAT/PROT UR: 0.1 RATIO
CRP SERPL-MCNC: <3 MG/L
DEPRECATED KAPPA LC FREE/LAMBDA SER: 0.94 RATIO
DSDNA AB SER-ACNC: <12 IU/ML
EGFR: 107 ML/MIN/1.73M2
ENA SS-A AB SER IA-ACNC: <0.2 AL
ENA SS-B AB SER IA-ACNC: <0.2 AL
ENDOMYSIUM IGA SER QL: NEGATIVE
ENDOMYSIUM IGA TITR SER: NORMAL
EOSINOPHIL # BLD AUTO: 0.18 K/UL
EOSINOPHIL NFR BLD AUTO: 2.2 %
EPITHELIAL CELLS: 0 /HPF
ERYTHROCYTE [SEDIMENTATION RATE] IN BLOOD BY WESTERGREN METHOD: 8 MM/HR
FOLATE SERPL-MCNC: 9.5 NG/ML
GAMMA GLOB FLD ELPH-MCNC: 0.8 G/DL
GAMMA GLOB MFR SERPL ELPH: 11.7 %
GLIADIN IGA SER QL: <5 UNITS
GLIADIN IGG SER QL: <5 UNITS
GLIADIN PEPTIDE IGA SER-ACNC: NEGATIVE
GLIADIN PEPTIDE IGG SER-ACNC: NEGATIVE
GLUCOSE QUALITATIVE U: NEGATIVE MG/DL
GLUCOSE SERPL-MCNC: 119 MG/DL
HAV IGM SER QL: NONREACTIVE
HBV CORE IGG+IGM SER QL: NONREACTIVE
HBV CORE IGM SER QL: NONREACTIVE
HBV SURFACE AG SER QL: NONREACTIVE
HCT VFR BLD CALC: 45.4 %
HCV AB SER QL: NONREACTIVE
HCV S/CO RATIO: 0.07 S/CO
HGB BLD-MCNC: 15.2 G/DL
IGA SER QL IEP: 165 MG/DL
IGG SER QL IEP: 1179 MG/DL
IGG SER QL IEP: 1179 MG/DL
IGG1 SER-MCNC: 385 MG/DL
IGG2 SER-MCNC: 421 MG/DL
IGG3 SER-MCNC: 50.6 MG/DL
IGG4 SER-MCNC: 137.3 MG/DL
IGM SER QL IEP: 34 MG/DL
IMM GRANULOCYTES NFR BLD AUTO: 0.2 %
INTERPRETATION SERPL IEP-IMP: NORMAL
KAPPA LC CSF-MCNC: 1.44 MG/DL
KAPPA LC SERPL-MCNC: 1.36 MG/DL
KETONES URINE: NEGATIVE MG/DL
LDH SERPL-CCNC: 158 U/L
LEUKOCYTE ESTERASE URINE: NEGATIVE
LYMPHOCYTES # BLD AUTO: 2.75 K/UL
LYMPHOCYTES NFR BLD AUTO: 32.9 %
M PROTEIN SPEC IFE-MCNC: NORMAL
MAGNESIUM SERPL-MCNC: 2.2 MG/DL
MAN DIFF?: NORMAL
MCHC RBC-ENTMCNC: 31.5 PG
MCHC RBC-ENTMCNC: 33.5 GM/DL
MCV RBC AUTO: 94.2 FL
MICROSCOPIC-UA: NORMAL
MONOCYTES # BLD AUTO: 0.86 K/UL
MONOCYTES NFR BLD AUTO: 10.3 %
NEUTROPHILS # BLD AUTO: 4.43 K/UL
NEUTROPHILS NFR BLD AUTO: 52.8 %
NITRITE URINE: NEGATIVE
PH URINE: 7.5
PHOSPHATE SERPL-MCNC: 2.6 MG/DL
PLATELET # BLD AUTO: 235 K/UL
POTASSIUM SERPL-SCNC: 4.1 MMOL/L
PROT SERPL-MCNC: 7.2 G/DL
PROT SERPL-MCNC: 7.2 G/DL
PROT UR-MCNC: 14 MG/DL
PROTEIN URINE: NEGATIVE MG/DL
RBC # BLD: 4.82 M/UL
RBC # FLD: 13.8 %
RED BLOOD CELLS URINE: 1 /HPF
RF+CCP IGG SER-IMP: NEGATIVE
RHEUMATOID FACT SER QL: <10 IU/ML
SODIUM SERPL-SCNC: 139 MMOL/L
SPECIFIC GRAVITY URINE: 1.01
T PALLIDUM AB SER QL IA: NEGATIVE
T3 SERPL-MCNC: 138 NG/DL
T3RU NFR SERPL: 1.2 TBI
T4 FREE SERPL-MCNC: 1.3 NG/DL
T4 SERPL-MCNC: 8.9 UG/DL
THYROGLOB AB SERPL-ACNC: <20 IU/ML
THYROPEROXIDASE AB SERPL IA-ACNC: 25.5 IU/ML
TSH SERPL-ACNC: 1.36 UIU/ML
TTG IGA SER IA-ACNC: <1.2 U/ML
TTG IGA SER-ACNC: NEGATIVE
TTG IGG SER IA-ACNC: 1.3 U/ML
TTG IGG SER IA-ACNC: NEGATIVE
URATE SERPL-MCNC: 3.3 MG/DL
UROBILINOGEN URINE: 1 MG/DL
VIT B12 SERPL-MCNC: 508 PG/ML
WBC # FLD AUTO: 8.37 K/UL
WHITE BLOOD CELLS URINE: 0 /HPF

## 2024-02-20 PROCEDURE — 99417 PROLNG OP E/M EACH 15 MIN: CPT

## 2024-02-20 PROCEDURE — 99215 OFFICE O/P EST HI 40 MIN: CPT

## 2024-02-20 PROCEDURE — G2211 COMPLEX E/M VISIT ADD ON: CPT | Mod: NC,1L

## 2024-02-23 PROBLEM — Z72.0 TOBACCO USE: Status: ACTIVE | Noted: 2024-02-05

## 2024-02-23 PROBLEM — Z82.69 FAMILY HISTORY OF FIBROMYALGIA: Status: ACTIVE | Noted: 2024-02-05

## 2024-02-23 PROBLEM — Z78.9 DOES NOT USE ILLICIT DRUGS: Status: ACTIVE | Noted: 2024-02-23

## 2024-02-23 PROBLEM — R68.89 COLD INTOLERANCE: Status: RESOLVED | Noted: 2024-02-23 | Resolved: 2024-02-23

## 2024-02-23 PROBLEM — U07.1 COVID-19 VIRUS INFECTION: Status: RESOLVED | Noted: 2024-02-23 | Resolved: 2024-02-23

## 2024-02-23 PROBLEM — Z87.898 FORMER CONSUMPTION OF ALCOHOL: Status: ACTIVE | Noted: 2024-02-05

## 2024-02-23 PROBLEM — F17.210 CIGARETTE SMOKER: Status: ACTIVE | Noted: 2021-09-14

## 2024-02-23 PROBLEM — Z56.0 UNEMPLOYED: Status: ACTIVE | Noted: 2024-02-05

## 2024-02-23 RX ORDER — NAPROXEN 500 MG/1
500 TABLET ORAL
Qty: 60 | Refills: 1 | Status: DISCONTINUED | COMMUNITY
Start: 2024-02-06 | End: 2024-02-23

## 2024-02-23 NOTE — CONSULT LETTER
[Consult Letter:] : I had the pleasure of evaluating your patient, [unfilled]. [Dear  ___] : Dear  [unfilled], [Please see my note below.] : Please see my note below. [Consult Closing:] : Thank you very much for allowing me to participate in the care of this patient.  If you have any questions, please do not hesitate to contact me. [DrShyam  ___] : Dr. HOOKS [FreeTextEntry3] : Myron Myron I. Kleiner, M.D., FACR Chief, Division of Rheumatology Department of Medicine [facility] [Sincerely,] : Sincerely,

## 2024-02-23 NOTE — REVIEW OF SYSTEMS
[Feeling Tired] : feeling tired [Arthralgias] : arthralgias [Joint Pain] : joint pain [Joint Stiffness] : joint stiffness [As Noted in HPI] : as noted in HPI [Negative] : Heme/Lymph [Dry Eyes] : no dryness of the eyes

## 2024-02-23 NOTE — PHYSICAL EXAM
[General Appearance - Alert] : alert [General Appearance - In No Acute Distress] : in no acute distress [General Appearance - Well Nourished] : well nourished [General Appearance - Well Developed] : well developed [General Appearance - Well-Appearing] : healthy appearing [Sclera] : the sclera and conjunctiva were normal [PERRL With Normal Accommodation] : pupils were equal in size, round, and reactive to light [Extraocular Movements] : extraocular movements were intact [Outer Ear] : the ears and nose were normal in appearance [Neck Appearance] : the appearance of the neck was normal [Neck Cervical Mass (___cm)] : no neck mass was observed [Jugular Venous Distention Increased] : there was no jugular-venous distention [Thyroid Diffuse Enlargement] : the thyroid was not enlarged [Lungs Percussion] : the lungs were normal to percussion [Heart Rate And Rhythm] : heart rate was normal and rhythm regular [Edema] : there was no peripheral edema [Abdomen Soft] : soft [Abdomen Tenderness] : non-tender [Abdomen Mass (___ Cm)] : no abdominal mass palpated [Cervical Lymph Nodes Enlarged Posterior Bilaterally] : posterior cervical [Cervical Lymph Nodes Enlarged Anterior Bilaterally] : anterior cervical [Supraclavicular Lymph Nodes Enlarged Bilaterally] : supraclavicular [Axillary Lymph Nodes Enlarged Bilaterally] : axillary [Femoral Lymph Nodes Enlarged Bilaterally] : femoral [Inguinal Lymph Nodes Enlarged Bilaterally] : inguinal [No CVA Tenderness] : no ~M costovertebral angle tenderness [No Spinal Tenderness] : no spinal tenderness [Skin Color & Pigmentation] : normal skin color and pigmentation [Skin Turgor] : normal skin turgor [] : no rash [Skin Lesions] : no skin lesions [Cranial Nerves] : cranial nerves 2-12 were intact [Sensation] : the sensory exam was normal to light touch and pinprick [Motor Exam] : the motor exam was normal [No Focal Deficits] : no focal deficits [Oriented To Time, Place, And Person] : oriented to person, place, and time [Impaired Insight] : insight and judgment were intact [Affect] : the affect was normal [Mood] : the mood was normal [FreeTextEntry1] : Strength-5/5

## 2024-02-23 NOTE — ASSESSMENT
[FreeTextEntry1] : Impression: ELENA ALMEIDA is a 43 year old man who returns for his initial follow up for further evaluation of joint symptoms and rheumatic diseases, including osteoarthritis, Raynaud's, fibromyalgia, chronic neck pain/cervical herniated disc, chronic low back pain/lumbar herniated disc.  Patient feels the same symptoms as last visit . intermittent pain left greater than right forearm, wrist and all finger  without paresthesias Secondary to cervical radiculopathy secondary to osteoarthritis and herniated disc. Neck pain radiated down both arms. low back pain with radiation to bilateral lower legs - left to the distal thigh and right to the toes Secondary to LS radiculopathy secondary to his osteoarthritis and herniated disc lumbar spine.  He has sleep disturbance and fatigue secondary to his fibromyalgia which is also contributing to his joint pains.  Rare Raynaud's episodes. Denies exercise. Denies trauma or injury. No rash, oral/genital ulcers, alopecia, chest pain, fever, or other joint symptoms. Denies recent dry eyes, dry mouth or dry skin but examination shows dry eyes and dry mouth--- I will monitor him for Sjogren syndrome. No improvement from prior visit.  The naproxen and the current dose of duloxetine are not giving him adequate relief.  Recent  xray results were all normal.  However, MRI cervical spine April 2022 revealed small herniated disc c4-5 and muscle spasm. MRI of the LS spine 11/22 revealed herniated disc L4-5 with nerve root impingement.  MRI of both hips in November 2022 revealed bilateral osteoarthritis and right labral tear.  Recent lab results reveal elevated cortisol 19.4 (2.7-10.5), slightly elevated  IG4 137(1-123), otherwise normal with extensive discussion.  Patient denies rash or side effects with current medications.    Plan: I reviewed chart and previous records  I reviewed his recent lab tests with the patient with extensive discussion I reviewed his recent x-ray results with the patient with extensive discussion I reviewed his previous multiple MRI study results as noted above with extensive discussions Laboratory tests ordered - see list below - with coordination of care MRI ordered C spine (no contraindications) - with coordination of care MRI LS spine (no contraindication) ordered--with coordination of care  Diagnosis and prognosis discussed Continue current medications (other than those changed below) Discontinue Naproxen  Increase Duloxetine ER  30 mg in the morning to 60 mg at night for 7 day; then 60 mg twice daily (Possible side effects explained) Nabumetone 1000 mg b.i.d. end of breakfast and supper (Possible side effects explained including cardiovascular risk/MI/CVA) Considered Short course of prednisone- Pt declined Physical thearpy discussed-pt declined Artificial tears one drop each eye q.i.d. and p.r.n.(Possible side effects explained) Biotene mouthwash/spray q.i.d. and p.r.n.(Possible side effects explained)  Oral Hydration Patient declines oral medication for dryness Keep hands and feet and torso warm - patient warned of dangers of gangrene/digital amputation with Raynaud's - with extensive discussion Keep home thermostat 72-74 - with extensive discussion Daily exercise starting at 10 minutes per day, gradually increasing to at least 30 minutes per day - emphasized  Return visit 3 months All questions and concerns were addressed Total time for this office visit, including face-to-face time and non-face-to-face time, 86 minutes--- including review of the chart and previous records, detailed review of his extensive medical history, review of previous lab results with extensive discussion with the patient, ordering lab tests with coordination of care, review of recent imaging reports/x-ray results with extensive discussion with the patient, review of multiple previous MRI results with extensive discussions, ordering of new MRIs of the cervical spine and also LS-spine with coordination of care, detailed medication history, review of medications going forward with their possible side effects, reviewed with the patient again the protocol for prevention of Raynaud's, reviewed the protocol for topical treatment of dry eyes and dry mouth, reviewed the impact of the patient's rheumatic disease on their other medical problems, reviewed the impact of the patient's other medical problems on their rheumatic disease

## 2024-02-23 NOTE — CONSULT LETTER
[Dear  ___] : Dear  [unfilled], [Consult Letter:] : I had the pleasure of evaluating your patient, [unfilled]. [Please see my note below.] : Please see my note below. [Consult Closing:] : Thank you very much for allowing me to participate in the care of this patient.  If you have any questions, please do not hesitate to contact me. [Sincerely,] : Sincerely, [DrShyam  ___] : Dr. HOOKS [FreeTextEntry3] : Myron Myron I. Kleiner, M.D., FACR Chief, Division of Rheumatology Department of Medicine [facility]

## 2024-02-23 NOTE — ADDENDUM
[FreeTextEntry1] :  I, Carlos Stratton , acted solely as a scribe for Dr. Myron I. Kleiner, MD. on 02/20/2024.

## 2024-02-23 NOTE — PHYSICAL EXAM
[General Appearance - Alert] : alert [General Appearance - In No Acute Distress] : in no acute distress [General Appearance - Well Nourished] : well nourished [General Appearance - Well Developed] : well developed [General Appearance - Well-Appearing] : healthy appearing [Sclera] : the sclera and conjunctiva were normal [PERRL With Normal Accommodation] : pupils were equal in size, round, and reactive to light [Extraocular Movements] : extraocular movements were intact [Outer Ear] : the ears and nose were normal in appearance [Neck Appearance] : the appearance of the neck was normal [Neck Cervical Mass (___cm)] : no neck mass was observed [Jugular Venous Distention Increased] : there was no jugular-venous distention [Thyroid Diffuse Enlargement] : the thyroid was not enlarged [Lungs Percussion] : the lungs were normal to percussion [Heart Rate And Rhythm] : heart rate was normal and rhythm regular [Edema] : there was no peripheral edema [Abdomen Soft] : soft [Abdomen Tenderness] : non-tender [Abdomen Mass (___ Cm)] : no abdominal mass palpated [Cervical Lymph Nodes Enlarged Posterior Bilaterally] : posterior cervical [Cervical Lymph Nodes Enlarged Anterior Bilaterally] : anterior cervical [Supraclavicular Lymph Nodes Enlarged Bilaterally] : supraclavicular [Axillary Lymph Nodes Enlarged Bilaterally] : axillary [Femoral Lymph Nodes Enlarged Bilaterally] : femoral [Inguinal Lymph Nodes Enlarged Bilaterally] : inguinal [No CVA Tenderness] : no ~M costovertebral angle tenderness [No Spinal Tenderness] : no spinal tenderness [Skin Color & Pigmentation] : normal skin color and pigmentation [Skin Turgor] : normal skin turgor [] : no rash [Skin Lesions] : no skin lesions [Cranial Nerves] : cranial nerves 2-12 were intact [Sensation] : the sensory exam was normal to light touch and pinprick [Motor Exam] : the motor exam was normal [No Focal Deficits] : no focal deficits [Oriented To Time, Place, And Person] : oriented to person, place, and time [Impaired Insight] : insight and judgment were intact [Affect] : the affect was normal [Mood] : the mood was normal [Oropharynx] : the oropharynx was normal [FreeTextEntry1] : Strength-5/5

## 2024-02-23 NOTE — HISTORY OF PRESENT ILLNESS
[FreeTextEntry1] : ELENA ALMEIDA is a 43 year old  man who was referred for further evaluation of joint symptoms and rheumatic diseases.  2 year ago, patient developed neck pain with radiation to the left upper extremity to the fingers with paresthesias and low back with radiation to bilateral lower legs - left to the distal thigh and right to the toes. Intermittent symptoms with weightbearing and occasionally at rest. Saw various MDs and imaging studies - patient was told of cervical herniated disc and compressed disc low back. PCP  treated with Sulindac 200 mg b.i.d. p.r.n. with some relief and Duloxetine 30 mg q.d. with some relief. Pain Management Dr. Urszula Parsons treated with physical therapy with little relief and epidural steroid injections x5 without relief. Today, pain balls of both feet and plantar aspects both heels. If joint pains increase, patient takes Aleve 440 mg q.d. p.r.n. with relief - better relief then Sulindac. Pain not worse in the morning. Morning stiffness lasts all day. Denies trauma or injury. No rash, oral/genital ulcers, alopecia, chest pain, fever, or other joint symptoms. No recent Raynaud's episodes. Some sleep disturbance and fatigue -with snoring - no history of sleep study. Denies dry eyes, dry mouth or dry skin.  History of multiple tick bites in the past, but he denies any history of Lyme disease.  Pain Management Dr. Urszula Parsons referred patient here for further evaluation.

## 2024-02-23 NOTE — ASSESSMENT
[FreeTextEntry1] : Impression: ELENA ALMEIDA is a 43 year old  man who was referred for further evaluation of joint symptoms and rheumatic diseases.  2 year history of neck pain with radiation to the left upper extremity to the fingers with paresthesias and low back with radiation to bilateral lower legs - left to the distal thigh and right to the toes. I will evaluate for various types of rheumatic diseases. Intermittent symptoms with weightbearing and occasionally at rest. Saw various MDs and was told of cervical herniated disc and compressed disc low back. PCP  treated with Sulindac 200 mg b.i.d. p.r.n. with some relief and Duloxetine 30 mg q.d. with some relief. Pain Management Dr. Urszula Parsons treated with physical therapy with little relief and epidural steroid injections x5 without relief. Today, pain balls of both feet and plantar aspects both heels. If joint pains increase, patient takes Aleve 440 mg q.d. p.r.n. with relief - better relief then Sulindac. No rash, oral/genital ulcers, alopecia, chest pain, fever, or other joint symptoms. No recent Raynaud's episodes, however patient was having a mild episode on exam today. Some sleep disturbance and fatigue consistent with fibromyalgia -with snoring - no history of sleep study. Patient has dry eyes and dry mouth, although not bothersome to him - consider Sjogren's Syndrome and evaluate for sarcoidosis, hepatitis C, IgG 4 related disease and other related diseases. MRI bilateral hips November 2022 revealed osteoarthritis and right labral tear. MRI LS Spine November 2022 revealed herniated disc.  MRI thoracic spine July 2022 normal.  MRI Cervical Spine April 2022 revealed herniated disc, muscle spasm.  X-rays LS spine May 2022--normal, with extensive discussion pain Management Dr. Urszula Parsons referred patient here for further evaluation.  Plan: I reviewed chart and previous records I reviewed MRI Bilateral Hips results with patient with extensive discussion I reviewed MRI LS Spine results with patient with extensive discussion  I reviewed MRI Cervical Spine results with patient with extensive discussion  I reviewed his multiple previous MRI and x-ray results with extensive discussion Laboratory tests ordered - see list below - with coordination of care X-rays ordered - see list below - with coordination of care  Diagnosis and prognosis discussed Continue current medications (other than those changed below) Discontinue Sulindac Naproxen 500 mg b.i.d end of breakfast and supper (possible side effects explained) Prophylactic aspirin 81 mg q.d. end of lunch (Possible side effects explained)  Increase Duloxetine 30 mg b.i.d. (Possible side effects explained) Tylenol 1000 mg t.i.d. p.r.n. joint pain or Tylenol 1300 mg b.i.d. p.r.n. joint pain (possible side effects explained) Artificial tears one drop each eye q.i.d. and p.r.n.(Possible side effects explained) Biotene mouthwash/spray q.i.d. and p.r.n.(Possible side effects explained)  Oral Hydration Patient declines oral medication for dryness Keep hands and feet and torso warm - patient warned of dangers of gangrene/digital amputation with Raynaud's - with extensive discussion Keep home thermostat 72-74 - with extensive discussion Daily exercise starting at 10 minutes per day, gradually increasing to at least 30 minutes per day - emphasized  Return visit 2-3 weeks All questions and concerns were addressed Total time for this office visit, including face-to-face time and non-face-to-face time, 96 minutes--- including review of the chart and previous records, detailed review of his extensive medical history, review of previous lab results with extensive discussion with the patient, ordering lab tests with coordination of care, review of multiple previous MRI and x-ray imaging reports/x-ray results as noted above with extensive discussion with the patient, ordering of new x-rays with coordination of care, detailed medication history, review of medications going forward with their possible side effects, protocol reviewed extensively regarding Raynaud's and its prevention

## 2024-02-23 NOTE — HISTORY OF PRESENT ILLNESS
[FreeTextEntry1] : ELENA ALMEIDA is a 43 year old man presents for his initial follow-up visit further evaluation of joint symptoms and rheumatic diseases.   Patient feels the same symptoms as last visit . intermittent pain left greater than right forearm, wrist and all finger  without paresthesias. Neck pain radiated down both arms. low back with radiation to bilateral lower legs - left to the distal thigh and right to the toes. Rare Raynaud's episodes, not bothersome.  He has sleep disturbance and fatigue.  Denies exercise. Denies trauma or injury. No rash, oral/genital ulcers, alopecia, chest pain, fever, or other joint symptoms. Denies recent dry eyes, dry mouth or dry skin. No improvement from prior visit.

## 2024-05-23 ENCOUNTER — APPOINTMENT (OUTPATIENT)
Dept: RHEUMATOLOGY | Facility: CLINIC | Age: 44
End: 2024-05-23

## 2024-05-28 LAB
CORTIS SERPL-MCNC: 23.6 UG/DL
ERYTHROCYTE [SEDIMENTATION RATE] IN BLOOD BY WESTERGREN METHOD: 8 MM/HR
IGG SER QL IEP: 810 MG/DL
IGG1 SER-MCNC: 323 MG/DL
IGG2 SER-MCNC: 342 MG/DL
IGG3 SER-MCNC: 31.6 MG/DL
IGG4 SER-MCNC: 97.4 MG/DL

## 2024-06-17 ENCOUNTER — APPOINTMENT (OUTPATIENT)
Dept: RHEUMATOLOGY | Facility: CLINIC | Age: 44
End: 2024-06-17
Payer: MEDICAID

## 2024-06-17 VITALS
SYSTOLIC BLOOD PRESSURE: 128 MMHG | HEIGHT: 68 IN | OXYGEN SATURATION: 97 % | WEIGHT: 116 LBS | RESPIRATION RATE: 17 BRPM | BODY MASS INDEX: 17.58 KG/M2 | TEMPERATURE: 98.1 F | DIASTOLIC BLOOD PRESSURE: 80 MMHG | HEART RATE: 94 BPM

## 2024-06-17 DIAGNOSIS — M54.2 CERVICALGIA: ICD-10-CM

## 2024-06-17 DIAGNOSIS — G89.29 LUMBAGO WITH SCIATICA, LEFT SIDE: ICD-10-CM

## 2024-06-17 DIAGNOSIS — M54.42 LUMBAGO WITH SCIATICA, LEFT SIDE: ICD-10-CM

## 2024-06-17 DIAGNOSIS — M15.9 POLYOSTEOARTHRITIS, UNSPECIFIED: ICD-10-CM

## 2024-06-17 DIAGNOSIS — G89.29 CERVICALGIA: ICD-10-CM

## 2024-06-17 DIAGNOSIS — M54.41 LUMBAGO WITH SCIATICA, LEFT SIDE: ICD-10-CM

## 2024-06-17 DIAGNOSIS — M54.12 RADICULOPATHY, CERVICAL REGION: ICD-10-CM

## 2024-06-17 DIAGNOSIS — H04.123 DRY EYE SYNDROME OF BILATERAL LACRIMAL GLANDS: ICD-10-CM

## 2024-06-17 DIAGNOSIS — R68.2 DRY MOUTH, UNSPECIFIED: ICD-10-CM

## 2024-06-17 DIAGNOSIS — M79.7 FIBROMYALGIA: ICD-10-CM

## 2024-06-17 DIAGNOSIS — I73.00 RAYNAUD'S SYNDROME W/OUT GANGRENE: ICD-10-CM

## 2024-06-17 DIAGNOSIS — M50.20 OTHER CERVICAL DISC DISPLACEMENT, UNSPECIFIED CERVICAL REGION: ICD-10-CM

## 2024-06-17 DIAGNOSIS — M51.26 OTHER INTERVERTEBRAL DISC DISPLACEMENT, LUMBAR REGION: ICD-10-CM

## 2024-06-17 PROCEDURE — 99417 PROLNG OP E/M EACH 15 MIN: CPT

## 2024-06-17 PROCEDURE — 99215 OFFICE O/P EST HI 40 MIN: CPT

## 2024-06-17 PROCEDURE — G2211 COMPLEX E/M VISIT ADD ON: CPT | Mod: NC

## 2024-06-20 RX ORDER — NABUMETONE 500 MG/1
500 TABLET, FILM COATED ORAL
Qty: 120 | Refills: 3 | Status: DISCONTINUED | COMMUNITY
Start: 2024-02-23 | End: 2024-06-20

## 2024-06-20 RX ORDER — IBUPROFEN 600 MG/1
600 TABLET, FILM COATED ORAL
Qty: 270 | Refills: 0 | Status: ACTIVE | COMMUNITY
Start: 2024-06-20 | End: 1900-01-01

## 2024-06-20 RX ORDER — DULOXETINE HYDROCHLORIDE 30 MG/1
30 CAPSULE, DELAYED RELEASE PELLETS ORAL
Qty: 90 | Refills: 3 | Status: DISCONTINUED | COMMUNITY
Start: 1900-01-01 | End: 2024-06-20

## 2024-06-20 RX ORDER — ASPIRIN ENTERIC COATED TABLETS 81 MG 81 MG/1
81 TABLET, DELAYED RELEASE ORAL
Qty: 100 | Refills: 0 | Status: ACTIVE | COMMUNITY
Start: 2024-02-06

## 2024-06-20 NOTE — ASSESSMENT
[FreeTextEntry1] : Impression: ELENA ALMEIDA is a 43 year old man who presents for follow up office visit for further evaluation of joint symptoms and rheumatic diseases including osteoarthritis, Raynaud's, fibromyalgia, chronic neck pain/cervical herniated disc, chronic low back pain/lumbar herniated disc.  Neck pain radiating to the upper back Secondary to a combination of chronic neck pain/cervical herniated disc and osteoarthritis. Low back pain radiating to the right greater than left lower legs Secondary to chronic low back pain/lumbar herniated disc and osteoarthritis. From previous physical exam right trochanteric buritis - resolved. On his own pt Discontinued Nabumetone Secondary to dyspepsia and diarrhea-- resolved. Occasional Raynauds Episodes - not bothersome.  He has some sleep disturbance and fatigue secondary to his fibromyalgia for which she did increase the duloxetine to 60 mg twice daily.  Recent laboratory tests results reveal no significant changes or results, with extensive discussion.  Recent MRI LS spine revealed stable mild osteoarthritis, with extensive discussion.  Recent MRI cervical spine revealed several herniated disks without spinal stenosis, with extensive discussion.  Patient is content with current medication regimen.  Plan: I reviewed chart and previous records  I reviewed his recent lab tests with the patient with extensive discussion I reviewed his recent MRI results of the cervical spine with extensive discussion I reviewed his recent MRI results of the LS spine with extensive discussion Laboratory tests ordered - see list below - with coordination of care  Diagnosis and prognosis discussed Continue current medications (other than those changed below) Stay off Nabumetone  Ibuprofen 600 mg t.i.d. end of meals (Possible side effects explained including cardiovascular risk/MI/CVA) Prophylactic aspirin 81 mg q.d. with a snack between two meals (Possible side effects explained) Artificial tears one drop each eye q.i.d. and p.r.n.(Possible side effects explained) Biotene mouthwash/spray q.i.d. and p.r.n.(Possible side effects explained)  Oral Hydration Patient declines oral medication for dryness Keep hands and feet and torso warm - patient warned of dangers of gangrene/digital amputation with Raynaud's--extensive discussion Increase home thermostat to at least 72 to 74 F--extensive discussion Daily exercise starting at 10 minutes per day, gradually increasing to at least 30 minutes per day - emphasized  Return visit 4 months All questions and concerns were addressed Total time for this office visit, including face-to-face time and non-face-to-face time, 71 minutes--- including review of the chart and previous records, detailed review of his medical history, review of previous lab results with extensive discussion with the patient, ordering lab tests with coordination of care, review of previous imaging reports/x-ray results, review of his recent MRI cervical spine results with extensive discussion, review of his recent MRI LS spine results with extensive discussion with the patient, detailed medication history, review of medications going forward with their possible side effects, reviewed the protocol for prevention of Raynaud's with extensive discussion as noted above, reviewed the modalities for treatment of his dryness with extensive discussion

## 2024-06-20 NOTE — ADDENDUM
[FreeTextEntry1] :  I, Carlos Stratton, acted solely as a scribe for Dr. Myron I. Kleiner, MD. on 06/17/2024. I personally performed the services described in the documentation, reviewed the documentation recorded by the scribe in my presence, and it accurately and completely records my words and actions.

## 2024-06-20 NOTE — HISTORY OF PRESENT ILLNESS
[FreeTextEntry1] : ELENA ALMEIDA is a 43 year old man who presents for follow up office visit for further evaluation of joint symptoms and rheumatic diseases including osteoarthritis, Raynaud's, fibromyalgia, chronic neck pain/cervical herniated disc, chronic low back pain/lumbar herniated disc.  Neck pain radiating to the upper back. Low back pain radiating to the right greater than left lower legs. On his own pt Discontinued Nabumetone Secondary to dyspepsia and diarrhea- resolved.  He did increase the duloxetine to 60 mg twice daily.  He has some sleep disturbance and fatigue.  Occasional Raynauds Episodes - not bothersome. Patient is content with current medication regimen.

## 2024-06-20 NOTE — CONSULT LETTER
[Dear  ___] : Dear  [unfilled], [Consult Letter:] : I had the pleasure of evaluating your patient, [unfilled]. [Please see my note below.] : Please see my note below. [Consult Closing:] : Thank you very much for allowing me to participate in the care of this patient.  If you have any questions, please do not hesitate to contact me. [Sincerely,] : Sincerely, [DrShyam  ___] : Dr. HOOKS [FreeTextEntry3] : Myron Myron I. Kleiner, M.D., FACR Chief, Division of Rheumatology Department of Medicine [facility]

## 2024-06-20 NOTE — REASON FOR VISIT
[Follow-Up: _____] : a [unfilled] follow-up visit [FreeTextEntry1] :  joint symptoms and rheumatic diseases.

## 2024-07-09 RX ORDER — DIFLUNISAL 500 MG/1
500 TABLET, FILM COATED ORAL
Qty: 180 | Refills: 0 | Status: ACTIVE | COMMUNITY
Start: 2024-07-09 | End: 1900-01-01

## 2024-08-21 ENCOUNTER — APPOINTMENT (OUTPATIENT)
Dept: RHEUMATOLOGY | Facility: CLINIC | Age: 44
End: 2024-08-21

## 2024-10-08 ENCOUNTER — APPOINTMENT (OUTPATIENT)
Dept: RHEUMATOLOGY | Facility: CLINIC | Age: 44
End: 2024-10-08
Payer: MEDICAID

## 2024-10-08 VITALS
OXYGEN SATURATION: 97 % | TEMPERATURE: 98.4 F | SYSTOLIC BLOOD PRESSURE: 128 MMHG | DIASTOLIC BLOOD PRESSURE: 68 MMHG | HEART RATE: 96 BPM | HEIGHT: 68 IN

## 2024-10-08 DIAGNOSIS — H04.123 DRY EYE SYNDROME OF BILATERAL LACRIMAL GLANDS: ICD-10-CM

## 2024-10-08 DIAGNOSIS — M54.41 LUMBAGO WITH SCIATICA, LEFT SIDE: ICD-10-CM

## 2024-10-08 DIAGNOSIS — M19.90 UNSPECIFIED OSTEOARTHRITIS, UNSPECIFIED SITE: ICD-10-CM

## 2024-10-08 DIAGNOSIS — R68.2 DRY MOUTH, UNSPECIFIED: ICD-10-CM

## 2024-10-08 DIAGNOSIS — M50.20 OTHER CERVICAL DISC DISPLACEMENT, UNSPECIFIED CERVICAL REGION: ICD-10-CM

## 2024-10-08 DIAGNOSIS — Z79.1 LONG TERM (CURRENT) USE OF NON-STEROIDAL ANTI-INFLAMMATORIES (NSAID): ICD-10-CM

## 2024-10-08 DIAGNOSIS — M15.9 POLYOSTEOARTHRITIS, UNSPECIFIED: ICD-10-CM

## 2024-10-08 DIAGNOSIS — M54.12 RADICULOPATHY, CERVICAL REGION: ICD-10-CM

## 2024-10-08 DIAGNOSIS — M79.7 FIBROMYALGIA: ICD-10-CM

## 2024-10-08 DIAGNOSIS — M51.26 OTHER INTERVERTEBRAL DISC DISPLACEMENT, LUMBAR REGION: ICD-10-CM

## 2024-10-08 DIAGNOSIS — I73.00 RAYNAUD'S SYNDROME W/OUT GANGRENE: ICD-10-CM

## 2024-10-08 DIAGNOSIS — G89.29 CERVICALGIA: ICD-10-CM

## 2024-10-08 DIAGNOSIS — M54.42 LUMBAGO WITH SCIATICA, LEFT SIDE: ICD-10-CM

## 2024-10-08 DIAGNOSIS — G89.29 LUMBAGO WITH SCIATICA, LEFT SIDE: ICD-10-CM

## 2024-10-08 DIAGNOSIS — M54.2 CERVICALGIA: ICD-10-CM

## 2024-10-08 PROCEDURE — 99215 OFFICE O/P EST HI 40 MIN: CPT

## 2024-10-08 PROCEDURE — G2211 COMPLEX E/M VISIT ADD ON: CPT | Mod: NC

## 2024-10-09 RX ORDER — OMEPRAZOLE 40 MG/1
40 CAPSULE, DELAYED RELEASE ORAL
Qty: 90 | Refills: 0 | Status: ACTIVE | COMMUNITY
Start: 2024-10-09

## 2024-10-09 RX ORDER — GABAPENTIN 300 MG/1
300 CAPSULE ORAL
Qty: 90 | Refills: 1 | Status: ACTIVE | COMMUNITY
Start: 2024-10-09

## 2024-10-09 RX ORDER — DULOXETINE HYDROCHLORIDE 20 MG/1
20 CAPSULE, DELAYED RELEASE PELLETS ORAL
Qty: 56 | Refills: 0 | Status: ACTIVE | COMMUNITY
Start: 2024-10-09 | End: 1900-01-01

## 2024-11-15 ENCOUNTER — NON-APPOINTMENT (OUTPATIENT)
Age: 44
End: 2024-11-15

## 2025-02-03 ENCOUNTER — RX RENEWAL (OUTPATIENT)
Age: 45
End: 2025-02-03

## 2025-02-18 ENCOUNTER — NON-APPOINTMENT (OUTPATIENT)
Age: 45
End: 2025-02-18

## 2025-02-18 ENCOUNTER — APPOINTMENT (OUTPATIENT)
Dept: RHEUMATOLOGY | Facility: CLINIC | Age: 45
End: 2025-02-18

## 2025-02-18 VITALS
TEMPERATURE: 98.9 F | OXYGEN SATURATION: 97 % | DIASTOLIC BLOOD PRESSURE: 60 MMHG | HEART RATE: 75 BPM | HEIGHT: 68 IN | SYSTOLIC BLOOD PRESSURE: 114 MMHG

## 2025-02-18 DIAGNOSIS — R68.2 DRY MOUTH, UNSPECIFIED: ICD-10-CM

## 2025-02-18 DIAGNOSIS — G89.29 LUMBAGO WITH SCIATICA, LEFT SIDE: ICD-10-CM

## 2025-02-18 DIAGNOSIS — M50.20 OTHER CERVICAL DISC DISPLACEMENT, UNSPECIFIED CERVICAL REGION: ICD-10-CM

## 2025-02-18 DIAGNOSIS — M51.26 OTHER INTERVERTEBRAL DISC DISPLACEMENT, LUMBAR REGION: ICD-10-CM

## 2025-02-18 DIAGNOSIS — Z79.1 LONG TERM (CURRENT) USE OF NON-STEROIDAL ANTI-INFLAMMATORIES (NSAID): ICD-10-CM

## 2025-02-18 DIAGNOSIS — H04.123 DRY EYE SYNDROME OF BILATERAL LACRIMAL GLANDS: ICD-10-CM

## 2025-02-18 DIAGNOSIS — M15.9 POLYOSTEOARTHRITIS, UNSPECIFIED: ICD-10-CM

## 2025-02-18 DIAGNOSIS — I73.00 RAYNAUD'S SYNDROME W/OUT GANGRENE: ICD-10-CM

## 2025-02-18 DIAGNOSIS — M54.42 LUMBAGO WITH SCIATICA, LEFT SIDE: ICD-10-CM

## 2025-02-18 DIAGNOSIS — M79.7 FIBROMYALGIA: ICD-10-CM

## 2025-02-18 DIAGNOSIS — G89.29 CERVICALGIA: ICD-10-CM

## 2025-02-18 DIAGNOSIS — M54.2 CERVICALGIA: ICD-10-CM

## 2025-02-18 DIAGNOSIS — M54.41 LUMBAGO WITH SCIATICA, LEFT SIDE: ICD-10-CM

## 2025-02-18 PROCEDURE — 99215 OFFICE O/P EST HI 40 MIN: CPT

## 2025-02-18 PROCEDURE — 99417 PROLNG OP E/M EACH 15 MIN: CPT

## 2025-02-18 PROCEDURE — G2211 COMPLEX E/M VISIT ADD ON: CPT | Mod: NC

## 2025-02-23 RX ORDER — IBUPROFEN 200 MG/1
200 TABLET, COATED ORAL
Qty: 100 | Refills: 0 | Status: ACTIVE | COMMUNITY

## 2025-02-23 RX ORDER — GABAPENTIN 800 MG/1
800 TABLET, COATED ORAL
Qty: 180 | Refills: 0 | Status: ACTIVE | COMMUNITY
Start: 2025-02-23 | End: 1900-01-01

## 2025-05-13 ENCOUNTER — NON-APPOINTMENT (OUTPATIENT)
Age: 45
End: 2025-05-13

## 2025-06-04 ENCOUNTER — APPOINTMENT (OUTPATIENT)
Dept: RHEUMATOLOGY | Facility: CLINIC | Age: 45
End: 2025-06-04
Payer: MEDICAID

## 2025-06-04 VITALS
HEART RATE: 85 BPM | SYSTOLIC BLOOD PRESSURE: 110 MMHG | DIASTOLIC BLOOD PRESSURE: 70 MMHG | HEIGHT: 68 IN | RESPIRATION RATE: 17 BRPM | OXYGEN SATURATION: 98 %

## 2025-06-04 DIAGNOSIS — M79.7 FIBROMYALGIA: ICD-10-CM

## 2025-06-04 DIAGNOSIS — I73.00 RAYNAUD'S SYNDROME W/OUT GANGRENE: ICD-10-CM

## 2025-06-04 DIAGNOSIS — M51.26 OTHER INTERVERTEBRAL DISC DISPLACEMENT, LUMBAR REGION: ICD-10-CM

## 2025-06-04 DIAGNOSIS — R68.2 DRY MOUTH, UNSPECIFIED: ICD-10-CM

## 2025-06-04 DIAGNOSIS — M54.41 LUMBAGO WITH SCIATICA, LEFT SIDE: ICD-10-CM

## 2025-06-04 DIAGNOSIS — M54.42 LUMBAGO WITH SCIATICA, LEFT SIDE: ICD-10-CM

## 2025-06-04 DIAGNOSIS — M25.551 PAIN IN RIGHT HIP: ICD-10-CM

## 2025-06-04 DIAGNOSIS — M50.20 OTHER CERVICAL DISC DISPLACEMENT, UNSPECIFIED CERVICAL REGION: ICD-10-CM

## 2025-06-04 DIAGNOSIS — G89.29 LUMBAGO WITH SCIATICA, LEFT SIDE: ICD-10-CM

## 2025-06-04 DIAGNOSIS — M54.2 CERVICALGIA: ICD-10-CM

## 2025-06-04 DIAGNOSIS — M15.9 POLYOSTEOARTHRITIS, UNSPECIFIED: ICD-10-CM

## 2025-06-04 DIAGNOSIS — Z79.1 LONG TERM (CURRENT) USE OF NON-STEROIDAL ANTI-INFLAMMATORIES (NSAID): ICD-10-CM

## 2025-06-04 DIAGNOSIS — G89.29 CERVICALGIA: ICD-10-CM

## 2025-06-04 DIAGNOSIS — H04.123 DRY EYE SYNDROME OF BILATERAL LACRIMAL GLANDS: ICD-10-CM

## 2025-06-04 PROCEDURE — 99215 OFFICE O/P EST HI 40 MIN: CPT

## 2025-06-04 PROCEDURE — G2211 COMPLEX E/M VISIT ADD ON: CPT | Mod: NC

## 2025-06-04 PROCEDURE — 99417 PROLNG OP E/M EACH 15 MIN: CPT
